# Patient Record
Sex: FEMALE | URBAN - METROPOLITAN AREA
[De-identification: names, ages, dates, MRNs, and addresses within clinical notes are randomized per-mention and may not be internally consistent; named-entity substitution may affect disease eponyms.]

---

## 2023-10-10 ENCOUNTER — APPOINTMENT (RX ONLY)
Dept: URBAN - METROPOLITAN AREA CLINIC 129 | Facility: CLINIC | Age: 43
Setting detail: DERMATOLOGY
End: 2023-10-10

## 2023-10-10 PROBLEM — D48.5 NEOPLASM OF UNCERTAIN BEHAVIOR OF SKIN: Status: ACTIVE | Noted: 2023-10-10

## 2023-10-10 PROCEDURE — ? BIOPSY BY SHAVE METHOD

## 2023-10-10 PROCEDURE — 11102 TANGNTL BX SKIN SINGLE LES: CPT

## 2023-10-10 PROCEDURE — ? ADDITIONAL NOTES

## 2023-10-26 ENCOUNTER — APPOINTMENT (RX ONLY)
Dept: URBAN - METROPOLITAN AREA CLINIC 129 | Facility: CLINIC | Age: 43
Setting detail: DERMATOLOGY
End: 2023-10-26

## 2023-10-26 PROBLEM — C44.319 BASAL CELL CARCINOMA OF SKIN OF OTHER PARTS OF FACE: Status: ACTIVE | Noted: 2023-10-26

## 2023-10-26 PROCEDURE — 17311 MOHS 1 STAGE H/N/HF/G: CPT

## 2023-10-26 PROCEDURE — 13131 CMPLX RPR F/C/C/M/N/AX/G/H/F: CPT

## 2023-10-26 PROCEDURE — ? REPAIR NOTE

## 2023-10-26 PROCEDURE — ? PRESCRIPTION

## 2023-10-26 PROCEDURE — ? MOHS SURGERY

## 2023-10-26 RX ORDER — MUPIROCIN 20 MG/G
OINTMENT TOPICAL
Qty: 15 | Refills: 3 | COMMUNITY
Start: 2023-10-26

## 2023-10-26 RX ADMIN — MUPIROCIN: 20 OINTMENT TOPICAL at 00:00

## 2023-10-26 NOTE — PROCEDURE: REPAIR NOTE
MSW made referral to [facility]CP program per Pt's request. L/m for [facility] for them to follow up with Pt. He is inquiring about obtaining same caregiver that his sister has. Will follow.    Z Plasty Text: The lesion was extirpated to the level of the fat with a #15 scalpel blade.  Given the location of the defect, shape of the defect and the proximity to free margins a Z-plasty was deemed most appropriate for repair.  Using a sterile surgical marker, the appropriate transposition arms of the Z-plasty were drawn incorporating the defect and placing the expected incisions within the relaxed skin tension lines where possible.    The area thus outlined was incised deep to adipose tissue with a #15 scalpel blade.  The skin margins were undermined to an appropriate distance in all directions utilizing iris scissors.  The opposing transposition arms were then transposed into place in opposite direction and anchored with interrupted buried subcutaneous sutures.

## 2023-10-26 NOTE — PROCEDURE: MOHS SURGERY
Your provider has recommended orthotics/shoes/or a brace. Please call first for an appointment at one of the following company locations:    [facility]  Phone: [phone]    Fax: [phone]    [facility] location:  [address]  [address]  [place] [address]    [facility] location:  [address]  [address]  [place] [address]      [facility]  Phone: [phone]   Fax: [phone]  [facility] [place] [place] [address]      [facility]  Phone: [phone]   Fax: [phone]  [facility] [address] [address]  Phone: [phone]    Fax: [phone] [address] [place] [address]     Bcc Infiltrative Histology Text: There were numerous aggregates of basaloid cells demonstrating an infiltrative pattern.

## 2023-10-26 NOTE — PROCEDURE: MOHS SURGERY
INITIAL NOTES-SITTING IN THE CHAIR COMPLAINING OF ABDOMINAL AND BACK PAIN. WILL MEDICATE. PT 
AMBULATE WITH STEADY GAIT WITHOUT THE BRACE YET. PT STATED THAT SHE IS UNCOMFORTABLE IN BED. 
IVF INFUSING WELL. WILL MONITOR. Staged Advancement Flap Text: The defect edges were debeveled with a #15 scalpel blade.  Given the location of the defect, shape of the defect and the proximity to free margins a staged advancement flap was deemed most appropriate.  Using a sterile surgical marker, an appropriate advancement flap was drawn incorporating the defect and placing the expected incisions within the relaxed skin tension lines where possible. The area thus outlined was incised deep to adipose tissue with a #15 scalpel blade.  The skin margins were undermined to an appropriate distance in all directions utilizing iris scissors.

## 2024-02-23 ENCOUNTER — APPOINTMENT (RX ONLY)
Dept: URBAN - METROPOLITAN AREA CLINIC 129 | Facility: CLINIC | Age: 44
Setting detail: DERMATOLOGY
End: 2024-02-23

## 2024-02-23 DIAGNOSIS — L82.1 OTHER SEBORRHEIC KERATOSIS: ICD-10-CM

## 2024-02-23 DIAGNOSIS — L85.3 XEROSIS CUTIS: ICD-10-CM

## 2024-02-23 DIAGNOSIS — D22 MELANOCYTIC NEVI: ICD-10-CM

## 2024-02-23 DIAGNOSIS — D18.0 HEMANGIOMA: ICD-10-CM

## 2024-02-23 DIAGNOSIS — L81.4 OTHER MELANIN HYPERPIGMENTATION: ICD-10-CM

## 2024-02-23 DIAGNOSIS — L90.5 SCAR CONDITIONS AND FIBROSIS OF SKIN: ICD-10-CM

## 2024-02-23 DIAGNOSIS — Z85.828 PERSONAL HISTORY OF OTHER MALIGNANT NEOPLASM OF SKIN: ICD-10-CM

## 2024-02-23 PROBLEM — D22.5 MELANOCYTIC NEVI OF TRUNK: Status: ACTIVE | Noted: 2024-02-23

## 2024-02-23 PROBLEM — D18.01 HEMANGIOMA OF SKIN AND SUBCUTANEOUS TISSUE: Status: ACTIVE | Noted: 2024-02-23

## 2024-02-23 PROCEDURE — ? ADDITIONAL NOTES

## 2024-02-23 PROCEDURE — ? COUNSELING

## 2024-02-23 PROCEDURE — ? SUNSCREEN RECOMMENDATIONS

## 2024-02-23 PROCEDURE — 99213 OFFICE O/P EST LOW 20 MIN: CPT

## 2024-02-23 ASSESSMENT — LOCATION SIMPLE DESCRIPTION DERM
LOCATION SIMPLE: UPPER BACK
LOCATION SIMPLE: LEFT CHEEK
LOCATION SIMPLE: RIGHT CHEEK
LOCATION SIMPLE: ABDOMEN

## 2024-02-23 ASSESSMENT — LOCATION DETAILED DESCRIPTION DERM
LOCATION DETAILED: INFERIOR THORACIC SPINE
LOCATION DETAILED: SUPERIOR THORACIC SPINE
LOCATION DETAILED: LEFT INFERIOR CENTRAL MALAR CHEEK
LOCATION DETAILED: PERIUMBILICAL SKIN
LOCATION DETAILED: EPIGASTRIC SKIN
LOCATION DETAILED: RIGHT INFERIOR CENTRAL MALAR CHEEK

## 2024-02-23 ASSESSMENT — LOCATION ZONE DERM
LOCATION ZONE: TRUNK
LOCATION ZONE: FACE
